# Patient Record
Sex: MALE | Race: OTHER | NOT HISPANIC OR LATINO | Employment: FULL TIME | URBAN - METROPOLITAN AREA
[De-identification: names, ages, dates, MRNs, and addresses within clinical notes are randomized per-mention and may not be internally consistent; named-entity substitution may affect disease eponyms.]

---

## 2022-08-17 ENCOUNTER — APPOINTMENT (OUTPATIENT)
Dept: RADIOLOGY | Facility: MEDICAL CENTER | Age: 32
End: 2022-08-17
Payer: OTHER MISCELLANEOUS

## 2022-08-17 ENCOUNTER — HOSPITAL ENCOUNTER (EMERGENCY)
Facility: MEDICAL CENTER | Age: 32
End: 2022-08-17
Attending: EMERGENCY MEDICINE
Payer: OTHER MISCELLANEOUS

## 2022-08-17 VITALS
HEART RATE: 70 BPM | WEIGHT: 293.21 LBS | DIASTOLIC BLOOD PRESSURE: 94 MMHG | SYSTOLIC BLOOD PRESSURE: 154 MMHG | OXYGEN SATURATION: 94 % | HEIGHT: 70 IN | TEMPERATURE: 97.7 F | RESPIRATION RATE: 16 BRPM | BODY MASS INDEX: 41.98 KG/M2

## 2022-08-17 DIAGNOSIS — T14.8XXA BRUISE: ICD-10-CM

## 2022-08-17 PROCEDURE — 73630 X-RAY EXAM OF FOOT: CPT | Mod: RT

## 2022-08-17 PROCEDURE — 99283 EMERGENCY DEPT VISIT LOW MDM: CPT

## 2022-08-17 RX ORDER — IBUPROFEN 800 MG/1
800 TABLET ORAL EVERY 8 HOURS PRN
Qty: 30 TABLET | Refills: 0 | Status: SHIPPED | OUTPATIENT
Start: 2022-08-17

## 2022-08-17 NOTE — LETTER
"  FORM C-4:  EMPLOYEE’S CLAIM FOR COMPENSATION/ REPORT OF INITIAL TREATMENT  EMPLOYEE’S CLAIM - PROVIDE ALL INFORMATION REQUESTED   First Name Kailash Last Name Keiry Birthdate 1990  Sex male Claim Number   Home Address [address]   [facility]             Zip [address]                                   Age  32 y.o. 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 (if applicable) Loading a semi trailer    How did this injury or occupational disease occur? Be specific and answer in detail. Use additional sheet if necessary)  games started to tilt to the side and ended up falling   If you believe that you have an occupational disease, when did you first have knowledge of the disability and it relationship to your employment? N/A Witnesses to the Accident  Michael/Catherine   Nature of Injury or Occupational Disease  Workers' Compensation Part(s) of Body Injured or Affected  Foot (R), N/A, N/A    I CERTIFY THAT THE ABOVE IS TRUE AND CORRECT TO THE BEST OF MY KNOWLEDGE AND THAT I HAVE PROVIDED THIS INFORMATION IN ORDER TO OBTAIN THE BENEFITS OF [place] " OR [place]).  I HEREBY AUTHORIZE ANY PHYSICIAN, CHIROPRACTOR, SURGEON, PRACTITIONER, OR OTHER PERSON, ANY HOSPITAL, INCLUDING [facility] OR [facility], ANY MEDICAL SERVICE ORGANIZATION, ANY INSURANCE COMPANY, OR OTHER INSTITUTION OR ORGANIZATION TO RELEASE TO EACH OTHER, ANY MEDICAL OR OTHER INFORMATION, INCLUDING BENEFITS PAID OR PAYABLE, PERTINENT TO THIS INJURY OR DISEASE, EXCEPT INFORMATION RELATIVE TO DIAGNOSIS, TREATMENT AND/OR COUNSELING FOR AIDS, PSYCHOLOGICAL CONDITIONS, ALCOHOL OR CONTROLLED SUBSTANCES, FOR WHICH I MUST GIVE SPECIFIC AUTHORIZATION.  A PHOTOSTAT OF THIS AUTHORIZATION SHALL BE AS VALID AS THE ORIGINAL.  Date    08/17/2022                                  Place     [facility]                                                                        Employee’s Signature   THIS REPORT MUST BE COMPLETED AND MAILED WITHIN 3 WORKING DAYS OF TREATMENT   Place [facility], EMERGENCY DEPT                       Name of Facility [facility]   Date  8/17/2022 Diagnosis  (T14.8XXA) Bruise Is there evidence the injured employee was under the influence of alcohol and/or another controlled substance at the time of accident?   Hour  8:22 PM Description of Injury or Disease  Bruise No   Treatment  motrin  Have you advised the patient to remain off work five days or more?         No   X-Ray Findings  Negative If Yes   From Date    To Date      From information given by the employee, together with medical evidence, can you directly connect this injury or occupational disease as job incurred? Yes If No, is employee capable of: Full Duty  No Modified Duty  Yes   Is additional medical care by a physician indicated? Yes If Modified Duty, Specify any Limitations / Restrictions   Follow up with workmans comp prior to return   Do you know of any previous injury or disease contributing to this condition or occupational disease? No    Date 8/17/2022  "Print Doctor’s Name Robbi Wright I certify the employer’s copy of this form was mailed on:   Address [address] [id number] [phone] INSURER’S USE ONLY   Provider’s Tax ID Number [id number] Telephone Dept: [phone]    Doctor’s Signature e-ROBBI Linton D.O. Degree  M.D      Form C-4 (rev.10/07)                                                                         BRIEF DESCRIPTION OF RIGHTS AND BENEFITS  (Pursuant to [place] 616C.050)    Notice of Injury or Occupational Disease (Incident Report Form C-1): If an injury or occupational disease (OD) arises out of and in the course of employment, you must provide written notice to your employer as soon as practicable, but no later than 7 days after the accident or OD. Your employer shall maintain a sufficient supply of the required forms.    Claim for Compensation (Form C-4): If medical treatment is sought, the form C-4 is available at the place of initial treatment. A completed \"Claim for Compensation\" (Form C-4) must be filed within 90 days after an accident or OD. The treating physician or chiropractor must, within 3 working days after treatment, complete and mail to the employer, the employer's insurer and third-party , the Claim for Compensation.    Medical Treatment: If you require medical treatment for your on-the-job injury or OD, you may be required to select a physician or chiropractor from a list provided by your workers’ compensation insurer, if it has contracted with an Organization for Managed Care (MCO) or Preferred Provider Organization (PPO) or providers of health care. If your employer has not entered into a contract with an MCO or PPO, you may select a physician or chiropractor from the Panel of Physicians and Chiropractors. Any medical costs related to your industrial injury or OD will be paid by your insurer.    Temporary Total Disability (TTD): If your doctor has certified that you are unable to work " for a period of at least 5 consecutive days, or 5 cumulative days in a 20-day period, or places restrictions on you that your employer does not accommodate, you may be entitled to TTD compensation.    Temporary Partial Disability (TPD): If the wage you receive upon reemployment is less than the compensation for TTD to which you are entitled, the insurer may be required to pay you TPD compensation to make up the difference. TPD can only be paid for a maximum of 24 months.    Permanent Partial Disability (PPD): When your medical condition is stable and there is an indication of a PPD as a result of your injury or OD, within 30 days, your insurer must arrange for an evaluation by a rating physician or chiropractor to determine the degree of your PPD. The amount of your PPD award depends on the date of injury, the results of the PPD evaluation, your age and wage.    Permanent Total Disability (PTD): If you are medically certified by a treating physician or chiropractor as permanently and totally disabled and have been granted a PTD status by your insurer, you are entitled to receive monthly benefits not to exceed 66 2/3% of your average monthly wage. The amount of your PTD payments is subject to reduction if you previously received a lump-sum PPD award.    Vocational Rehabilitation Services: You may be eligible for vocational rehabilitation services if you are unable to return to the job due to a permanent physical impairment or permanent restrictions as a result of your injury or occupational disease.    Transportation and Per Shant Reimbursement: You may be eligible for travel expenses and per shant associated with medical treatment.    Reopening: You may be able to reopen your claim if your condition worsens after claim closure.     Appeal Process: If you disagree with a written determination issued by the insurer or the insurer does not respond to your request, you may appeal to the Department of Administration,  , by following the instructions contained in your determination letter. You must appeal the determination within 70 days from the date of the determination letter at [address], [address], [place], [place] [address], or [address], [address], [place], [place] [address]. If you disagree with the  decision, you may appeal to the Department of Administration, . You must file your appeal within 30 days from the date of the  decision letter at [address], [address], [place], [place] [address], or [address], [address], [place], [place] [address]. If you disagree with a decision of an , you may file a petition for judicial review with the District Court. You must do so within 30 days of the Appeal Officer’s decision. You may be represented by an  at your own expense or you may contact the [facility] for possible representation.    [place]  for Injured Workers (NAIW): If you disagree with a  decision, you may request that NAIW represent you without charge at an  Hearing. For information regarding denial of benefits, you may contact the [facility] at: [address], [address], [place] [address], [phone], or [address], [address], [place] [address], [phone]    To File a Complaint with the Division: If you wish to file a complaint with the  of the Division of Industrial Relations (DIR),  please contact the Workers’ Compensation Section, [address], [address], [place], [place] [address], telephone [phone], or [address], [place], [place] [address], telephone [phone].    For assistance with Workers’ Compensation Issues: You may contact the [facility] Office for Consumer Health Assistance, [address], [place], [place] [address], Toll Free 1-260.814.2503, Web site:  http://[facility].[place].gov/[place]/ANYA E-mail: anya@[facility].[place].gov  D-2 (rev. 10/20)              __________________________________________________________________                                    _________________            Employee Name / Signature                                                                                                                            Date

## 2022-08-18 NOTE — ED PROVIDER NOTES
"ED Provider Note    CHIEF COMPLAINT  Chief Complaint   Patient presents with    Foot Injury     A slot machine fell onto patient's right foot. Presents with pain, bruising, swelling.    Work-Related Injury       HPI  Kailash Ricci is a 32 y.o. male here for evaluation of right foot pain.  Patient states he was moving a machine at work on a enrique, when it slipped off and rolled onto the top of his foot.  He has no other medical concerns at this time.  He has not taken anything prior to or for the same.  Incident happened just prior to arrival.    ROS;  Please see HPI  O/W negative     PAST MEDICAL HISTORY   has a past medical history of Hypertension.    SOCIAL HISTORY  Social History     Tobacco Use    Smoking status: Some Days     Types: Cigarettes    Smokeless tobacco: Never   Substance and Sexual Activity    Alcohol use: Yes     Comment: 24oz beer daily    Drug use: Never    Sexual activity: Not on file       SURGICAL HISTORY  patient denies any surgical history    CURRENT MEDICATIONS  Home Medications       Reviewed by Ronald Pedersen R.N. (Registered Nurse) on 08/17/22 at 1728  Med List Status: Partial     Medication Last Dose Status        Patient Laith Taking any Medications                           ALLERGIES  No Known Allergies    REVIEW OF SYSTEMS  See HPI for further details. Review of systems as above, otherwise all other systems are negative.     PHYSICAL EXAM  VITAL SIGNS: BP (!) 153/106   Pulse (!) 111   Temp 36.4 °C (97.5 °F) (Temporal)   Resp 18   Ht 1.778 m (5' 10\")   Wt (!) 133 kg (293 lb 3.4 oz)   SpO2 93%   BMI 42.07 kg/m²     Constitutional: Well developed, well nourished. No acute distress.  HEENT: Normocephalic, atraumatic. MMM  Neck: Supple, Full range of motion   Chest/Pulmonary:  No respiratory distress.  Equal expansion   Musculoskeletal: No deformity, no edema, neurovascular intact.  Right foot; tenderness to the dorsum and proximal foot, nontender ankle, neurovascular " tact distally.  Superficial abrasion to the dorsum of the foot.  Neuro: Clear speech, appropriate, cooperative, cranial nerves II-XII grossly intact.  Psych: Normal mood and affect      PROCEDURES     MEDICAL RECORD  I have reviewed patient's medical record and pertinent results are listed.    COURSE & MEDICAL DECISION MAKING  I have reviewed any medical record information, laboratory studies and radiographic results as noted above.    DX-FOOT-COMPLETE 3+ RIGHT   Final Result      1.  No evidence of acute fracture or dislocation.   2.  Small subchondral cyst or erosion involving the head of the first metatarsal.   3.  Soft tissue swelling overlying the dorsal foot.        Pt was given crutches for his ambulation.      I you have had any blood pressure issues while here in the emergency department, please see your doctor for a further evaluation or work up.    Differential diagnoses include but not limited to: fracture vs strain     This patient presents with bruise to the foot .  At this time, I have counseled the patient/family regarding their medications, pain control, and follow up.  They will continue their medications, if any, as prescribed.  They will return immediately for any worsening symptoms and/or any other medical concerns.  They will see their doctor, or contact the doctor provided, in 1-2 days for follow up.       FINAL IMPRESSION  1. Bruise  ibuprofen (MOTRIN) 800 MG Tab              Electronically signed by: Robbi Wright D.O., 8/17/2022 7:51 PM

## 2022-08-18 NOTE — ED TRIAGE NOTES
"Chief Complaint   Patient presents with    Foot Injury     A slot machine fell onto patient's right foot. Presents with pain, bruising, swelling.    Work-Related Injury     ED Triage Vitals [08/17/22 1723]   Enc Vitals Group      Blood Pressure (!) 153/106      Pulse (!) 111      Respiration 18      Temperature 36.4 °C (97.5 °F)      Temp src Temporal      Pulse Oximetry 93 %      Weight (!) 133 kg (293 lb 3.4 oz)      Height 1.778 m (5' 10\")     "

## 2022-08-18 NOTE — ED NOTES
Pt given discharge instructions; verbalized understanding of instructions.  Ambulated out on crutches.

## 2022-08-19 ENCOUNTER — OCCUPATIONAL MEDICINE (OUTPATIENT)
Dept: URGENT CARE | Facility: PHYSICIAN GROUP | Age: 32
End: 2022-08-19
Payer: COMMERCIAL

## 2022-08-19 VITALS
OXYGEN SATURATION: 96 % | TEMPERATURE: 98.7 F | WEIGHT: 293 LBS | BODY MASS INDEX: 41.02 KG/M2 | DIASTOLIC BLOOD PRESSURE: 78 MMHG | RESPIRATION RATE: 14 BRPM | HEIGHT: 71 IN | HEART RATE: 98 BPM | SYSTOLIC BLOOD PRESSURE: 130 MMHG

## 2022-08-19 DIAGNOSIS — S90.31XA CONTUSION OF RIGHT FOOT, INITIAL ENCOUNTER: ICD-10-CM

## 2022-08-19 PROCEDURE — 99203 OFFICE O/P NEW LOW 30 MIN: CPT | Performed by: FAMILY MEDICINE

## 2022-08-19 NOTE — LETTER
[address] [place] [address]  Phone:  [phone] - Fax:  [phone]   [facility] Progress Report and Disability Certification  Date of Service: 8/19/2022   No Show:  No  Date / Time of Next Visit: 8/23/2022   Claim Information   Patient Name: Kailash Ricci  Claim Number:     Employer: [employer]  Date of Injury: 8/17/2022     Insurer / TPA: Misc Workers Comp  ID / SSN:     Occupation: Temp  Diagnosis: The encounter diagnosis was Contusion of right foot, initial encounter.    Medical Information   Related to Industrial Injury? Yes    Subjective Complaints:  DOI 0/17/22 DIEGO: moving a slot machine and it fell onto Rt foot. Seen in ER, xray shows no fracture.    Objective Findings:     Pre-Existing Condition(s):     Assessment:   Initial Visit    Status: Additional Care Required  Permanent Disability:No    Plan:      Diagnostics:      Comments:       Disability Information   Status: Released to Restricted Duty    From:  8/19/2022  Through: 8/23/2022 Restrictions are: Temporary   Physical Restrictions   Sitting:    Standing:    Stooping:    Bending:      Squatting:    Walking:    Climbing:    Pushing:      Pulling:    Other:    Reaching Above Shoulder (L):   Reaching Above Shoulder (R):       Reaching Below Shoulder (L):    Reaching Below Shoulder (R):      Not to exceed Weight Limits   Carrying(hrs):   Weight Limit(lb):   Lifting(hrs):   Weight  Limit(lb):     Comments: ** Mainly sedentary duty, may stand and walk only as tolerated **    Repetitive Actions   Hands: i.e. Fine Manipulations from Grasping:     Feet: i.e. Operating Foot Controls:     Driving / Operate Machinery:     Health Care Provider’s Original or Electronic Signature  Brant Reyes M.D. Health Care Provider’s Original or Electronic Signature    Dima Hull MD         Clinic Name / Location: [address] [place] [address] Clinic Phone Number: Dept: [phone]   Appointment Time: 2:35 Pm Visit Start Time: 3:19 PM   Check-In Time:  2:36 Pm Visit Discharge Time:     Original-Treating Physician or Chiropractor    Page 2-Insurer/TPA    Page 3-Employer    Page 4-Employee

## 2022-08-19 NOTE — PROGRESS NOTES
"Subjective     Kailash Ricci is a 32 y.o. male who presents with Follow-Up ([facility] 8.17.22/R Foot injury, feeling better and swelling went down. Bruises are starting to show more)      DOI 0/17/22 DIEGO: moving a slot machine and it fell onto Rt foot. Seen in ER, xray shows no fracture.      HPI    Review of Systems   Musculoskeletal:  Positive for joint pain.   All other systems reviewed and are negative.           Objective     /78 (BP Location: Right arm, Patient Position: Sitting, BP Cuff Size: Adult long)   Pulse 98   Temp 37.1 °C (98.7 °F) (Temporal)   Resp 14   Ht 1.803 m (5' 11\")   Wt (!) 133 kg (293 lb)   SpO2 96%   BMI 40.87 kg/m²      Physical Exam  Vitals and nursing note reviewed.   Constitutional:       General: He is not in acute distress.     Appearance: Normal appearance. He is well-developed.   HENT:      Head: Normocephalic.   Pulmonary:      Effort: Pulmonary effort is normal. No respiratory distress.   Musculoskeletal:        Feet:    Feet:      Comments: Rt foot: + mild to moderate edema, scattered areas of bruising, no wounds. + TTP. Good SROM foot/toes. NVI  Neurological:      Mental Status: He is alert.      Motor: No abnormal muscle tone.   Psychiatric:         Mood and Affect: Mood normal.         Behavior: Behavior normal.         Assessment & Plan       1. Contusion of right foot, initial encounter          Mainly sedentary duty, may stand and walk only as tolerated     4 days recheck, sooner if needed    RICE/Motrin    Pertinent prior lab work and/or imaging studies in Epic have been reviewed by me today on day of this visit.      Pertinent prior office visit notes in [facility] have been reviewed by me today on day of this visit.         "

## 2022-08-23 ENCOUNTER — OCCUPATIONAL MEDICINE (OUTPATIENT)
Dept: URGENT CARE | Facility: PHYSICIAN GROUP | Age: 32
End: 2022-08-23
Payer: COMMERCIAL

## 2022-08-23 VITALS
WEIGHT: 295 LBS | BODY MASS INDEX: 41.3 KG/M2 | HEART RATE: 74 BPM | SYSTOLIC BLOOD PRESSURE: 132 MMHG | HEIGHT: 71 IN | RESPIRATION RATE: 16 BRPM | DIASTOLIC BLOOD PRESSURE: 74 MMHG | OXYGEN SATURATION: 96 % | TEMPERATURE: 98.2 F

## 2022-08-23 DIAGNOSIS — S97.81XD CRUSHING INJURY OF RIGHT FOOT, SUBSEQUENT ENCOUNTER: ICD-10-CM
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
## 2022-08-23 PROCEDURE — 99213 OFFICE O/P EST LOW 20 MIN: CPT | Performed by: NURSE PRACTITIONER

## 2022-08-23 ASSESSMENT — ENCOUNTER SYMPTOMS: MYALGIAS: 1

## 2022-08-23 NOTE — PROGRESS NOTES
"Subjective:     Kailash Ricci is a 32 y.o. male who presents for Foot Injury ([facility] DOI 8/17/2022 (R) foot and feels like is getting a little better )      HPI  Pt presents for evaluation of an existing work comp injury. Copied from previous visit: Kailash Ricci is a 32 y.o. male here for evaluation of right foot pain.  Patient states he was moving a machine at work on a enrique, when it slipped off and rolled onto the top of his foot.    X-ray performed in ER was negative.  He was seen in the clinic 2 days following his emergency room visit where his pain and swelling was improving.  He was placed on sedentary duty.   Update 8/23/2022: He notes that his pain and swelling are improving each day.  He does continue to have localized swelling to dorsal aspect of right foot.  Bruising remains persistent.  He notes that he is able to put pressure on his right foot.  His pain remains tolerable.  He is continuing to ice and elevate as needed.  His first day back at work is tomorrow.  Review of Systems   Musculoskeletal:  Positive for myalgias. Negative for joint pain.     PMH:   Past Medical History:   Diagnosis Date    Hypertension      ALLERGIES: No Known Allergies  SURGHX: History reviewed. No pertinent surgical history.  SOCHX:   Social History     Socioeconomic History    Marital status:    Tobacco Use    Smoking status: Some Days     Types: Cigarettes    Smokeless tobacco: Never   Substance and Sexual Activity    Alcohol use: Yes     Comment: 24oz beer daily    Drug use: Never     FH: History reviewed. No pertinent family history.      Objective:   /74 (BP Location: Left arm, Patient Position: Sitting, BP Cuff Size: Large adult)   Pulse 74   Temp 36.8 °C (98.2 °F) (Temporal)   Resp 16   Ht 1.803 m (5' 11\")   Wt (!) 134 kg (295 lb)   SpO2 96%   BMI 41.14 kg/m²     Physical Exam  Vitals and nursing note reviewed.   Constitutional:       General: He is not in acute distress.     " Appearance: Normal appearance. He is not ill-appearing.   HENT:      Head: Normocephalic and atraumatic.      Right Ear: External ear normal.      Left Ear: External ear normal.      Nose: No congestion or rhinorrhea.      Mouth/Throat:      Mouth: Mucous membranes are moist.   Eyes:      Extraocular Movements: Extraocular movements intact.      Pupils: Pupils are equal, round, and reactive to light.   Cardiovascular:      Rate and Rhythm: Normal rate and regular rhythm.      Pulses: Normal pulses.      Heart sounds: Normal heart sounds.   Pulmonary:      Effort: Pulmonary effort is normal.      Breath sounds: Normal breath sounds.   Abdominal:      General: Abdomen is flat. Bowel sounds are normal.      Palpations: Abdomen is soft.      Tenderness: There is no abdominal tenderness. There is no right CVA tenderness or left CVA tenderness.   Musculoskeletal:         General: Normal range of motion.      Cervical back: Normal range of motion and neck supple.      Right foot: Swelling present.        Legs:       Comments: Localized swelling and ecchymosis present to right dorsal aspect of foot.  Range of motion is limited due to extensive swelling.  Strength of right foot 5/5.  Negative for numbness or tingling. Sensation intact.   Skin:     General: Skin is warm and dry.      Capillary Refill: Capillary refill takes less than 2 seconds.   Neurological:      General: No focal deficit present.      Mental Status: He is alert and oriented to person, place, and time. Mental status is at baseline.   Psychiatric:         Mood and Affect: Mood normal.         Behavior: Behavior normal.         Thought Content: Thought content normal.         Judgment: Judgment normal.       Assessment/Plan:   Assessment    1. Crushing injury of right foot, subsequent encounter        2. Work related injury        Patient to continue with work restrictions.  His symptoms are gradually improving.  Patient to follow-up in 7 days or sooner for  worsening symptoms.  Continue with rest, ice, elevation and compression.  Ibuprofen/Tylenol as needed for relief of discomfort.    AVS handout given and reviewed with patient. Pt educated on red flags and when to seek treatment back in ER or UC.

## 2022-08-23 NOTE — LETTER
[facility] of the [facility] [address] [place] [address]  Phone:  [phone] - Fax:  [phone]   [facility] Progress Report and Disability Certification  Date of Service: 8/23/2022   No Show:  No  Date / Time of Next Visit: 8/30/2022   Claim Information   Patient Name: Kailash Ricci  Claim Number:     Employer: [employer]  Date of Injury: 8/17/2022     Insurer / TPA: [employer] Workers [employer]  ID / SSN:     Occupation: Temp  Diagnosis: Diagnoses of Crushing injury of right foot, subsequent encounter and Work related injury were pertinent to this visit.    Medical Information   Related to Industrial Injury? Yes    Subjective Complaints:  Pt presents for evaluation of an existing work comp injury. Copied from previous visit: Kailash Ricci is a 32 y.o. male here for evaluation of right foot pain.  Patient states he was moving a machine at work on a enrique, when it slipped off and rolled onto the top of his foot.    X-ray performed in ER was negative.  He was seen in the clinic 2 days following his emergency room visit where his pain and swelling was improving.  He was placed on sedentary duty.   Update 8/23/2022: He notes that his pain and swelling are improving each day.  He does continue to have localized swelling to dorsal aspect of right foot.  Bruising remains persistent.  He notes that he is able to put pressure on his right foot.  His pain remains tolerable.  He is continuing to ice and elevate as needed.  His first day back at work is tomorrow.   Objective Findings: Comments: Localized swelling and ecchymosis present to right dorsal aspect of foot.  Range of motion is limited due to extensive swelling.  Strength of right foot 5/5.  Negative for numbness or tingling. Sensation intact.      Pre-Existing Condition(s):     Assessment:   Condition Improved    Status: Additional Care Required  Permanent Disability:No    Plan:       Diagnostics:      Comments:       Disability Information   Status: Released to Restricted Duty    From:  2022  Through: 2022 Restrictions are: Temporary   Physical Restrictions   Sitting:    Standing:  < or = to 4 hrs/day Stooping:    Bending:      Squatting:    Walking:  < or = to 4 hrs/day Climbin hrs/day Pushin hrs/day   Pullin hrs/day Other:    Reaching Above Shoulder (L):   Reaching Above Shoulder (R):       Reaching Below Shoulder (L):    Reaching Below Shoulder (R):      Not to exceed Weight Limits   Carrying(hrs):   Weight Limit(lb): < or = to 10 pounds Lifting(hrs):   Weight  Limit(lb): < or = to 10 pounds   Comments: Follow-up in 7 days or sooner for worsening symptoms.    Repetitive Actions   Hands: i.e. Fine Manipulations from Grasping:     Feet: i.e. Operating Foot Controls: 0 hrs/day   Driving / Operate Machinery: 0 hrs/day   Health Care Provider’s Original or Electronic Signature  RASHID MehtaPMarcR.N. Health Care Provider’s Original or Electronic Signature    Dima Hull MD         Clinic Name / Location: [address] [address] Clinic Phone Number: Dept: [phone]   Appointment Time: 10:30 Am Visit Start Time: 11:35 AM   Check-In Time:  10:48 Am Visit Discharge Time:  12:20PM   Original-Treating Physician or Chiropractor    Page 2-Insurer/TPA    Page 3-Employer    Page 4-Employee

## 2022-08-30 ENCOUNTER — OCCUPATIONAL MEDICINE (OUTPATIENT)
Dept: URGENT CARE | Facility: PHYSICIAN GROUP | Age: 32
End: 2022-08-30
Payer: COMMERCIAL

## 2022-08-30 VITALS
TEMPERATURE: 97.3 F | HEART RATE: 74 BPM | DIASTOLIC BLOOD PRESSURE: 78 MMHG | SYSTOLIC BLOOD PRESSURE: 130 MMHG | RESPIRATION RATE: 16 BRPM | OXYGEN SATURATION: 97 % | WEIGHT: 295 LBS | BODY MASS INDEX: 41.3 KG/M2 | HEIGHT: 71 IN
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
## 2022-08-30 DIAGNOSIS — S90.31XA CONTUSION OF RIGHT FOOT, INITIAL ENCOUNTER: ICD-10-CM

## 2022-08-30 PROCEDURE — 99213 OFFICE O/P EST LOW 20 MIN: CPT | Performed by: STUDENT IN AN ORGANIZED HEALTH CARE EDUCATION/TRAINING PROGRAM

## 2022-08-30 NOTE — LETTER
[facility] [address] [place] [address]  Phone:  [phone] - Fax:  [phone]   [facility] Progress Report and Disability Certification  Date of Service: 8/30/2022   No Show:  No  Date / Time of Next Visit: 9/5/2022   Claim Information   Patient Name: Kailash Ricci  Claim Number:     Employer: [employer]  Date of Injury: 8/17/2022     Insurer / TPA: M[employer] Workers Comp  ID / SSN:     Occupation: Temp  Diagnosis: Diagnoses of Contusion of right foot, initial encounter and Work related injury were pertinent to this visit.    Medical Information   Related to Industrial Injury? Yes    Subjective Complaints:  DOI: 8/17/22   DIEGO: moving a slot machine and it fell onto Rt foot.   Seen in ER, xray shows no fracture.    Visit #3: Patient states that he is at 60 or 70% better.  He has noticed decrease in swelling and bruising since last visit.  He has noticed increasing range of motion.  He denies pain with ambulation but states it does get sore after multiple hours on his feet.  He has been taking OTC NSAIDs for pain and icing at home.  Patient denies numbness or tingling or change in sensation.   Objective Findings: Right foot: Diffuse swelling localized to dorsum of foot. Overlying skin intact without ecchymosis or erythema. Normal range of motion. Strength 5/5. Normal capillary refill. Normal pulse.  Neurovascularly intact.   Pre-Existing Condition(s):     Assessment:   Condition Improved    Status: Additional Care Required  Permanent Disability:No    Plan:      Diagnostics:      Comments:       Disability Information   Status: Released to Restricted Duty    From:     Through: 9/5/2022 Restrictions are:     Physical Restrictions   Sitting:    Standing:  < or = to 6 hrs/day Stooping:    Bending:      Squatting:    Walking:  < or = to 6 hrs/day Climbing:    Pushing:      Pulling:    Other:    Reaching Above Shoulder (L):    Reaching Above Shoulder (R):       Reaching Below Shoulder (L):    Reaching Below Shoulder (R):      Not to exceed Weight Limits   Carrying(hrs):   Weight Limit(lb):   Lifting(hrs):   Weight  Limit(lb):     Comments: Work restrictions increased to walking/standing for < or = to 6 hours a day.  Patient to continue OTC NSAIDs, ice and elevation.  Anticipated discharge at next visit.    Repetitive Actions   Hands: i.e. Fine Manipulations from Grasping:     Feet: i.e. Operating Foot Controls:     Driving / Operate Machinery:     Health Care Provider’s Original or Electronic Signature  Nini Roberson P.A.-C. Health Care Provider’s Original or Electronic Signature    Dima Hull MD         Clinic Name / Location: [address] [address] Clinic Phone Number: Dept: [phone]   Appointment Time: 8:30 Am Visit Start Time: 8:32 AM   Check-In Time:  8:07 Am Visit Discharge Time:  10:00AM   Original-Treating Physician or Chiropractor    Page 2-Insurer/TPA    Page 3-Employer    Page 4-Employee

## 2022-09-06 ENCOUNTER — OCCUPATIONAL MEDICINE (OUTPATIENT)
Dept: URGENT CARE | Facility: PHYSICIAN GROUP | Age: 32
End: 2022-09-06
Payer: COMMERCIAL

## 2022-09-06 VITALS
BODY MASS INDEX: 42.37 KG/M2 | DIASTOLIC BLOOD PRESSURE: 82 MMHG | SYSTOLIC BLOOD PRESSURE: 128 MMHG | WEIGHT: 296 LBS | HEIGHT: 70 IN | OXYGEN SATURATION: 95 % | HEART RATE: 96 BPM | RESPIRATION RATE: 14 BRPM | TEMPERATURE: 97.5 F

## 2022-09-06 DIAGNOSIS — S90.31XA CONTUSION OF RIGHT FOOT, INITIAL ENCOUNTER: ICD-10-CM
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
## 2022-09-06 PROCEDURE — 99213 OFFICE O/P EST LOW 20 MIN: CPT | Performed by: PHYSICIAN ASSISTANT

## 2022-09-06 ASSESSMENT — ENCOUNTER SYMPTOMS
MYALGIAS: 0
CHILLS: 0
FEVER: 0
VOMITING: 0
NAUSEA: 0

## 2022-09-06 NOTE — LETTER
[facility] [address] [place] [address]  Phone:  [phone] - Fax:  [phone]   [facility] Progress Report and Disability Certification  Date of Service: 9/6/2022   No Show:  No  Date / Time of Next Visit:     Claim Information   Patient Name: Kailash Ricci  Claim Number:     Employer: [employer] Date of Injury: 8/17/2022     Insurer / TPA: Clive Michelle  ID / SSN:     Occupation: Temp  Diagnosis: Diagnoses of Contusion of right foot, initial encounter and Work related injury were pertinent to this visit.    Medical Information   Related to Industrial Injury? Yes    Subjective Complaints:  DOI: 8/17/22   DIEGO: moving a slot machine and it fell onto Rt foot.   Seen in ER, xray shows no fracture.     Visit #4: Patient states that his symptoms have fully resolved.  There remains some slight bruising on the top of the foot and he notices a slight amount of pain with direct pressure to the area.  However he is not experiencing any pain with ranging the foot and ankle, standing, walking.  He denies numbness and tingling.  He is no longer requiring use of OTC NSAIDs and ice to help manage his pain.  He request to be cleared for full duties and discharged today.   Objective Findings:  Right ankle/foot:  Appearance: Faint resolving ecchymosis on the dorsum of the midfoot.  No erythema, or deformity appreciated  Palpation: No TTP along medial malleolus or deltoid ligament.  No TTP of lateral malleolus, ATFL, CFL, or PTFL.  No TTP along midfoot, base of the 5th metatarsal, MTP joints, or toes.   ROM: FROM throughout  Strength: 5/5 throughout  Neurovascular: 2+ dorsalis pedis and posterior tibial.  Sensation intact      Pre-Existing Condition(s):     Assessment:   Condition Improved    Status: Discharged /  MMI  Permanent Disability:No    Plan:      Diagnostics:      Comments:  Ice and ibuprofen as needed for symptomatic flare.  yes  If patient develops any new or worsening symptoms return to clinic for reevaluation.  Patient discharged.    Disability Information   Status: Released to Full Duty    From:     Through:   Restrictions are:     Physical Restrictions   Sitting:    Standing:    Stooping:    Bending:      Squatting:    Walking:    Climbing:    Pushing:      Pulling:    Other:    Reaching Above Shoulder (L):   Reaching Above Shoulder (R):       Reaching Below Shoulder (L):    Reaching Below Shoulder (R):      Not to exceed Weight Limits   Carrying(hrs):   Weight Limit(lb):   Lifting(hrs):   Weight  Limit(lb):     Comments:      Repetitive Actions   Hands: i.e. Fine Manipulations from Grasping:     Feet: i.e. Operating Foot Controls:     Driving / Operate Machinery:     Health Care Provider’s Original or Electronic Signature  Alton Rene P.A.-C. Health Care Provider’s Original or Electronic Signature    Dima Hull MD         Clinic Name / Location: [address] [address] Clinic Phone Number: Dept: [phone]   Appointment Time: 8:15 Am Visit Start Time: 8:18 AM   Check-In Time:  8:15 Am Visit Discharge Time:  8:40 AM   Original-Treating Physician or Chiropractor    Page 2-Insurer/TPA    Page 3-Employer    Page 4-Employee

## 2022-09-06 NOTE — PROGRESS NOTES
"Subjective:   Kailash Ricci is a 32 y.o. male who presents for Follow-Up (Right foot injury , per patient has improved )       DOI: 8/17/22   DIEGO: moving a slot machine and it fell onto Rt foot.   Seen in ER, xray shows no fracture.     Visit #4: Patient states that his symptoms have fully resolved.  There remains some slight bruising on the top of the foot and he notices a slight amount of pain with direct pressure to the area.  However he is not experiencing any pain with ranging the foot and ankle, standing, walking.  He denies numbness and tingling.  He is no longer requiring use of OTC NSAIDs and ice to help manage his pain.  He request to be cleared for full duties and discharged today.    Review of Systems   Constitutional:  Negative for chills and fever.   Gastrointestinal:  Negative for nausea and vomiting.   Musculoskeletal:  Negative for joint pain and myalgias.     PMH:  has a past medical history of Hypertension.  MEDS:   Current Outpatient Medications:     ibuprofen (MOTRIN) 800 MG Tab, Take 1 Tablet by mouth every 8 hours as needed for Moderate Pain., Disp: 30 Tablet, Rfl: 0  ALLERGIES: No Known Allergies  SURGHX: No past surgical history on file.  SOCHX:  reports that he has been smoking cigarettes. He has never used smokeless tobacco. He reports current alcohol use. He reports that he does not use drugs.  FH: Family history was reviewed, no pertinent findings to report   Objective:   /82   Pulse 96   Temp 36.4 °C (97.5 °F) (Temporal)   Resp 14   Ht 1.778 m (5' 10\")   Wt (!) 134 kg (296 lb)   SpO2 95%   BMI 42.47 kg/m²   Physical Exam  Vitals reviewed.   Constitutional:       General: He is not in acute distress.     Appearance: Normal appearance. He is well-developed. He is not toxic-appearing.   HENT:      Head: Normocephalic and atraumatic.      Right Ear: External ear normal.      Left Ear: External ear normal.      Nose: Nose normal.   Cardiovascular:      Rate and Rhythm: " Normal rate and regular rhythm.   Pulmonary:      Effort: Pulmonary effort is normal. No respiratory distress.      Breath sounds: No stridor.   Musculoskeletal:      Comments: Right ankle/foot:  Appearance: Faint resolving ecchymosis on the dorsum of the midfoot.  No erythema, or deformity appreciated  Palpation: No TTP along medial malleolus or deltoid ligament.  No TTP of lateral malleolus, ATFL, CFL, or PTFL.  No TTP along midfoot, base of the 5th metatarsal, MTP joints, or toes.   ROM: FROM throughout  Strength: 5/5 throughout  Neurovascular: 2+ dorsalis pedis and posterior tibial.  Sensation intact        Skin:     General: Skin is dry.   Neurological:      Comments: Alert and oriented.    Psychiatric:         Speech: Speech normal.         Behavior: Behavior normal.        Assessment/Plan:   1. Contusion of right foot, initial encounter    2. Work related injury    Ice and ibuprofen as needed for symptomatic flare.  If patient develops any new or worsening symptoms return to clinic for reevaluation.  Patient discharged.    Differential diagnosis, natural history, supportive care, and indications for immediate follow-up discussed.

## 2023-05-09 ENCOUNTER — NON-PROVIDER VISIT (OUTPATIENT)
Dept: OCCUPATIONAL MEDICINE | Facility: CLINIC | Age: 33
End: 2023-05-09

## 2023-05-09 DIAGNOSIS — Z02.1 PRE-EMPLOYMENT DRUG SCREENING: ICD-10-CM

## 2023-05-09 LAB
AMP AMPHETAMINE: NORMAL
COC COCAINE: NORMAL
INT CON NEG: NORMAL
INT CON POS: NORMAL
MET METHAMPHETAMINES: NORMAL
OPI OPIATES: NORMAL
PCP PHENCYCLIDINE: NORMAL
POC DRUG COMMENT 753798-OCCUPATIONAL HEALTH: NORMAL
THC: NORMAL

## 2023-05-09 PROCEDURE — 80305 DRUG TEST PRSMV DIR OPT OBS: CPT | Performed by: PREVENTIVE MEDICINE

## 2023-05-15 ENCOUNTER — EH NON-PROVIDER (OUTPATIENT)
Dept: OCCUPATIONAL MEDICINE | Facility: CLINIC | Age: 33
End: 2023-05-15

## 2023-05-15 DIAGNOSIS — Z02.83 ENCOUNTER FOR DRUG SCREENING: ICD-10-CM

## 2023-05-15 PROCEDURE — 8911 PR MRO FEE: Performed by: NURSE PRACTITIONER
